# Patient Record
Sex: FEMALE | Race: WHITE | NOT HISPANIC OR LATINO
[De-identification: names, ages, dates, MRNs, and addresses within clinical notes are randomized per-mention and may not be internally consistent; named-entity substitution may affect disease eponyms.]

---

## 2017-01-21 PROBLEM — Z00.00 ENCOUNTER FOR PREVENTIVE HEALTH EXAMINATION: Status: ACTIVE | Noted: 2017-01-21

## 2017-03-24 ENCOUNTER — APPOINTMENT (OUTPATIENT)
Dept: CARDIOLOGY | Facility: CLINIC | Age: 77
End: 2017-03-24

## 2017-04-26 ENCOUNTER — APPOINTMENT (OUTPATIENT)
Dept: CARDIOLOGY | Facility: CLINIC | Age: 77
End: 2017-04-26

## 2017-04-28 ENCOUNTER — APPOINTMENT (OUTPATIENT)
Dept: CARDIOLOGY | Facility: CLINIC | Age: 77
End: 2017-04-28

## 2017-07-06 ENCOUNTER — APPOINTMENT (OUTPATIENT)
Dept: CARDIOLOGY | Facility: CLINIC | Age: 77
End: 2017-07-06

## 2017-10-30 ENCOUNTER — TRANSCRIPTION ENCOUNTER (OUTPATIENT)
Age: 77
End: 2017-10-30

## 2018-07-12 ENCOUNTER — APPOINTMENT (OUTPATIENT)
Dept: CARDIOLOGY | Facility: CLINIC | Age: 78
End: 2018-07-12
Payer: COMMERCIAL

## 2018-07-12 ENCOUNTER — NON-APPOINTMENT (OUTPATIENT)
Age: 78
End: 2018-07-12

## 2018-07-12 VITALS
HEART RATE: 65 BPM | SYSTOLIC BLOOD PRESSURE: 112 MMHG | WEIGHT: 178 LBS | HEIGHT: 68 IN | BODY MASS INDEX: 26.98 KG/M2 | DIASTOLIC BLOOD PRESSURE: 64 MMHG

## 2018-07-12 DIAGNOSIS — Z87.19 PERSONAL HISTORY OF OTHER DISEASES OF THE DIGESTIVE SYSTEM: ICD-10-CM

## 2018-07-12 DIAGNOSIS — Z78.9 OTHER SPECIFIED HEALTH STATUS: ICD-10-CM

## 2018-07-12 DIAGNOSIS — Z86.39 PERSONAL HISTORY OF OTHER ENDOCRINE, NUTRITIONAL AND METABOLIC DISEASE: ICD-10-CM

## 2018-07-12 DIAGNOSIS — Z01.810 ENCOUNTER FOR PREPROCEDURAL CARDIOVASCULAR EXAMINATION: ICD-10-CM

## 2018-07-12 DIAGNOSIS — Z86.018 PERSONAL HISTORY OF OTHER BENIGN NEOPLASM: ICD-10-CM

## 2018-07-12 DIAGNOSIS — C85.10 UNSPECIFIED B-CELL LYMPHOMA, UNSPECIFIED SITE: ICD-10-CM

## 2018-07-12 PROCEDURE — 99214 OFFICE O/P EST MOD 30 MIN: CPT

## 2018-07-12 PROCEDURE — 93000 ELECTROCARDIOGRAM COMPLETE: CPT

## 2018-07-12 RX ORDER — ESTRADIOL 10 UG/1
10 TABLET VAGINAL
Qty: 8 | Refills: 0 | Status: ACTIVE | COMMUNITY
Start: 2018-01-15

## 2018-07-12 RX ORDER — CELECOXIB 100 MG/1
100 CAPSULE ORAL
Refills: 0 | Status: ACTIVE | COMMUNITY

## 2018-07-12 RX ORDER — LEVOTHYROXINE SODIUM 75 UG/1
75 TABLET ORAL
Refills: 0 | Status: ACTIVE | COMMUNITY

## 2018-08-13 ENCOUNTER — APPOINTMENT (OUTPATIENT)
Dept: CARDIOLOGY | Facility: CLINIC | Age: 78
End: 2018-08-13
Payer: COMMERCIAL

## 2018-08-13 PROCEDURE — 93306 TTE W/DOPPLER COMPLETE: CPT

## 2018-08-13 PROCEDURE — 93880 EXTRACRANIAL BILAT STUDY: CPT

## 2018-09-28 ENCOUNTER — APPOINTMENT (OUTPATIENT)
Dept: CARDIOLOGY | Facility: CLINIC | Age: 78
End: 2018-09-28

## 2019-01-29 ENCOUNTER — TRANSCRIPTION ENCOUNTER (OUTPATIENT)
Age: 79
End: 2019-01-29

## 2019-07-18 ENCOUNTER — APPOINTMENT (OUTPATIENT)
Dept: CARDIOLOGY | Facility: CLINIC | Age: 79
End: 2019-07-18
Payer: COMMERCIAL

## 2019-07-18 ENCOUNTER — NON-APPOINTMENT (OUTPATIENT)
Age: 79
End: 2019-07-18

## 2019-07-18 VITALS
BODY MASS INDEX: 31.07 KG/M2 | SYSTOLIC BLOOD PRESSURE: 104 MMHG | WEIGHT: 182 LBS | DIASTOLIC BLOOD PRESSURE: 66 MMHG | HEIGHT: 64 IN | HEART RATE: 62 BPM

## 2019-07-18 PROCEDURE — 99214 OFFICE O/P EST MOD 30 MIN: CPT

## 2019-07-18 PROCEDURE — 93000 ELECTROCARDIOGRAM COMPLETE: CPT

## 2019-07-18 RX ORDER — OMEGA-3-ACID ETHYL ESTERS 1 G/1
1 CAPSULE, LIQUID FILLED ORAL
Refills: 0 | Status: DISCONTINUED | COMMUNITY
End: 2019-07-18

## 2019-07-18 NOTE — REASON FOR VISIT
[Follow-Up - Clinic] : a clinic follow-up of [Abnormal ECG] : an abnormal ECG [Chest Pain] : chest pain [Hyperlipidemia] : hyperlipidemia [Medication Management] : Medication management [Mitral Regurgitation] : mitral regurgitation

## 2019-07-18 NOTE — REVIEW OF SYSTEMS
[Dyspnea on exertion] : dyspnea during exertion [Chest Pain] : chest pain [see HPI] : see HPI [Under Stress] : under stress [Negative] : Heme/Lymph

## 2019-07-19 ENCOUNTER — TRANSCRIPTION ENCOUNTER (OUTPATIENT)
Age: 79
End: 2019-07-19

## 2019-07-22 NOTE — PHYSICAL EXAM
[General Appearance - Well Developed] : well developed [Normal Appearance] : normal appearance [Well Groomed] : well groomed [General Appearance - Well Nourished] : well nourished [No Deformities] : no deformities [General Appearance - In No Acute Distress] : no acute distress [Normal Conjunctiva] : the conjunctiva exhibited no abnormalities [Eyelids - No Xanthelasma] : the eyelids demonstrated no xanthelasmas [Normal Oral Mucosa] : normal oral mucosa [No Oral Pallor] : no oral pallor [No Oral Cyanosis] : no oral cyanosis [Normal Jugular Venous A Waves Present] : normal jugular venous A waves present [Normal Jugular Venous V Waves Present] : normal jugular venous V waves present [No Jugular Venous Dominguez A Waves] : no jugular venous dominguez A waves [Respiration, Rhythm And Depth] : normal respiratory rhythm and effort [Exaggerated Use Of Accessory Muscles For Inspiration] : no accessory muscle use [Auscultation Breath Sounds / Voice Sounds] : lungs were clear to auscultation bilaterally [Heart Rate And Rhythm] : heart rate and rhythm were normal [Heart Sounds] : normal S1 and S2 [Abdomen Soft] : soft [Abdomen Tenderness] : non-tender [Abdomen Mass (___ Cm)] : no abdominal mass palpated [Abnormal Walk] : normal gait [Gait - Sufficient For Exercise Testing] : the gait was sufficient for exercise testing [Nail Clubbing] : no clubbing of the fingernails [Cyanosis, Localized] : no localized cyanosis [Petechial Hemorrhages (___cm)] : no petechial hemorrhages [Skin Color & Pigmentation] : normal skin color and pigmentation [] : no rash [No Venous Stasis] : no venous stasis [Skin Lesions] : no skin lesions [No Skin Ulcers] : no skin ulcer [No Xanthoma] : no  xanthoma was observed [Oriented To Time, Place, And Person] : oriented to person, place, and time [Affect] : the affect was normal [Mood] : the mood was normal [No Anxiety] : not feeling anxious [FreeTextEntry1] : [facility] appex

## 2019-07-22 NOTE — HISTORY OF PRESENT ILLNESS
[FreeTextEntry1] : GANESH LONDONO  is a 78 year old  F\par History of lymphoma, hypothyroidism, GERD, hyperlipidemia, VHD, abnormal EKG, TIA, atherosclerosis, DM\par \par In the interim she reports her  was diagnosed with Alzheimer's. \par She is having more trouble with her 's cognitive dysfunction\par \par At baseline she is physically active.  She plays golf. She swims, bikesetc.\par Her cholesterol is persistently elevated.  She tries to watch her diet.  She has been tried on multiple statins as well as Zetia and welchol.\par There is no history of hypertension\par Told by her physicians in [place] that she has prediabetes and was started on metformin\par \par There is no prior history of clinical myocardial infarction, coronary revascularization, symptomatic congestive heart failure.\par \par There are no further episodes of chest pain, pressure or discomfort.  \par There is no significant dyspnea on exertion or orthopnea.  \par There are no symptomatic palpitations, lightheadedness, dizziness or syncope. \par \par Carotid Doppler, nonobstructive atherosclerosis, no stenosis\par Echocardiogram normal left ventricular function, minimal valvular heart disease\par EKG today demonstrates normal sinus with a competing ectopic atrial rhythm and poor wave progression. \par \par Nuclear stress test. April 2017. Exercise 4.5 minutes. Peak heart rate 124. Normal myocardial perfusion. \par \par May 2018. TSH 2.1, hemoglobin 14.1, total cholesterol 195, , creatinine 0.6\par

## 2019-07-22 NOTE — ASSESSMENT
[FreeTextEntry1] : History of TIA, atypical chest discomfort, hyperlipidemia, DM\par Abnormal EKG. \par Prior studies with no evidence of significant ischemic heart disease or LV dysfunction. \par No further symptoms of previously described chest discomfort\par Recent blood work has been requested from primary physician \par Recommend daily aspirin therapy.  Limit concomitant NSAID\par Continue present lipid lowering therapy. \par

## 2020-07-10 ENCOUNTER — NON-APPOINTMENT (OUTPATIENT)
Age: 80
End: 2020-07-10

## 2020-07-10 ENCOUNTER — APPOINTMENT (OUTPATIENT)
Dept: CARDIOLOGY | Facility: CLINIC | Age: 80
End: 2020-07-10
Payer: COMMERCIAL

## 2020-07-10 VITALS
WEIGHT: 164 LBS | BODY MASS INDEX: 28 KG/M2 | SYSTOLIC BLOOD PRESSURE: 118 MMHG | HEIGHT: 64 IN | HEART RATE: 71 BPM | DIASTOLIC BLOOD PRESSURE: 60 MMHG | OXYGEN SATURATION: 95 %

## 2020-07-10 PROCEDURE — 93000 ELECTROCARDIOGRAM COMPLETE: CPT

## 2020-07-10 PROCEDURE — 99213 OFFICE O/P EST LOW 20 MIN: CPT

## 2020-07-10 RX ORDER — ASPIRIN 81 MG
81 TABLET, DELAYED RELEASE (ENTERIC COATED) ORAL
Refills: 0 | Status: DISCONTINUED | COMMUNITY
End: 2020-07-10

## 2020-07-10 RX ORDER — LIFITEGRAST 50 MG/ML
5 SOLUTION/ DROPS OPHTHALMIC
Qty: 60 | Refills: 0 | Status: DISCONTINUED | COMMUNITY
Start: 2018-04-17 | End: 2020-07-10

## 2020-07-10 RX ORDER — RANITIDINE HYDROCHLORIDE 150 MG/1
150 CAPSULE ORAL
Refills: 0 | Status: DISCONTINUED | COMMUNITY
End: 2020-07-10

## 2020-07-10 RX ORDER — ICOSAPENT ETHYL 1000 MG/1
1 CAPSULE ORAL DAILY
Refills: 0 | Status: ACTIVE | COMMUNITY

## 2020-07-10 RX ORDER — FAMOTIDINE 40 MG/1
40 TABLET, FILM COATED ORAL DAILY
Refills: 0 | Status: ACTIVE | COMMUNITY

## 2020-07-10 RX ORDER — FLUVASTATIN SODIUM 80 MG/1
80 TABLET, EXTENDED RELEASE ORAL
Qty: 30 | Refills: 0 | Status: DISCONTINUED | COMMUNITY
Start: 2018-06-11 | End: 2020-07-10

## 2020-07-11 NOTE — HISTORY OF PRESENT ILLNESS
[FreeTextEntry1] : GANESH LONDONO  is a 80 year old  F\par History of lymphoma, hypothyroidism, GERD, hyperlipidemia, VHD, borderline EKG, ? TIA, atherosclerosis, DM\par There is no prior history of clinical myocardial infarction, coronary revascularization, symptomatic congestive heart failure.\par \par At baseline she is physically active.  She plays golf. She swims, bikesetc.\par There are no further episodes of chest pain, pressure or discomfort.  \par There is no significant dyspnea on exertion or orthopnea.  \par There are no symptomatic palpitations, lightheadedness, dizziness or syncope. \par Prior COVID exposure, had test\par Had labs with PMD\par Requsting refill of statin\par \par Carotid Doppler, nonobstructive atherosclerosis, no stenosis\par Echocardiogram normal left ventricular function, minimal valvular heart disease\par EKG PRWP\par \par Nuclear stress test. April 2017. Exercise 4.5 minutes. Peak heart rate 124. Normal myocardial perfusion. \par \par May 2018. TSH 2.1, hemoglobin 14.1, total cholesterol 195, , creatinine 0.6\par

## 2020-07-11 NOTE — PHYSICAL EXAM
[General Appearance - Well Developed] : well developed [Normal Appearance] : normal appearance [Well Groomed] : well groomed [General Appearance - Well Nourished] : well nourished [No Deformities] : no deformities [General Appearance - In No Acute Distress] : no acute distress [Normal Conjunctiva] : the conjunctiva exhibited no abnormalities [Normal Oral Mucosa] : normal oral mucosa [Eyelids - No Xanthelasma] : the eyelids demonstrated no xanthelasmas [No Oral Pallor] : no oral pallor [No Oral Cyanosis] : no oral cyanosis [Normal Jugular Venous V Waves Present] : normal jugular venous V waves present [Normal Jugular Venous A Waves Present] : normal jugular venous A waves present [No Jugular Venous Dominguez A Waves] : no jugular venous dominguez A waves [Respiration, Rhythm And Depth] : normal respiratory rhythm and effort [Exaggerated Use Of Accessory Muscles For Inspiration] : no accessory muscle use [Auscultation Breath Sounds / Voice Sounds] : lungs were clear to auscultation bilaterally [Heart Rate And Rhythm] : heart rate and rhythm were normal [Heart Sounds] : normal S1 and S2 [Abdomen Soft] : soft [Abdomen Tenderness] : non-tender [Abnormal Walk] : normal gait [Gait - Sufficient For Exercise Testing] : the gait was sufficient for exercise testing [Abdomen Mass (___ Cm)] : no abdominal mass palpated [Nail Clubbing] : no clubbing of the fingernails [Cyanosis, Localized] : no localized cyanosis [Petechial Hemorrhages (___cm)] : no petechial hemorrhages [Skin Color & Pigmentation] : normal skin color and pigmentation [] : no rash [No Venous Stasis] : no venous stasis [No Skin Ulcers] : no skin ulcer [Skin Lesions] : no skin lesions [No Xanthoma] : no  xanthoma was observed [Oriented To Time, Place, And Person] : oriented to person, place, and time [Affect] : the affect was normal [Mood] : the mood was normal [No Anxiety] : not feeling anxious [FreeTextEntry1] : [facility] appex tr edema

## 2020-07-11 NOTE — ASSESSMENT
[FreeTextEntry1] : History of TIA, atypical chest discomfort, hyperlipidemia, DM\par Abnormal EKG. \par Prior studies with no evidence of significant ischemic heart disease or LV dysfunction. \par No further symptoms of previously described chest discomfort\par Recent blood work has been requested from primary physician \par Recommend daily aspirin therapy.  Limit concomitant NSAID\par Likely adjust lipid lowering therapy after review of bloodwork. \par Follow up COVID test results

## 2020-07-14 ENCOUNTER — TRANSCRIPTION ENCOUNTER (OUTPATIENT)
Age: 80
End: 2020-07-14

## 2021-05-06 ENCOUNTER — NON-APPOINTMENT (OUTPATIENT)
Age: 81
End: 2021-05-06

## 2021-07-09 ENCOUNTER — APPOINTMENT (OUTPATIENT)
Dept: CARDIOLOGY | Facility: CLINIC | Age: 81
End: 2021-07-09
Payer: COMMERCIAL

## 2021-07-09 ENCOUNTER — NON-APPOINTMENT (OUTPATIENT)
Age: 81
End: 2021-07-09

## 2021-07-09 VITALS
TEMPERATURE: 97.1 F | BODY MASS INDEX: 28.17 KG/M2 | HEART RATE: 71 BPM | WEIGHT: 165 LBS | HEIGHT: 64 IN | OXYGEN SATURATION: 98 % | SYSTOLIC BLOOD PRESSURE: 100 MMHG | DIASTOLIC BLOOD PRESSURE: 52 MMHG

## 2021-07-09 DIAGNOSIS — I67.9 CEREBROVASCULAR DISEASE, UNSPECIFIED: ICD-10-CM

## 2021-07-09 PROCEDURE — 99214 OFFICE O/P EST MOD 30 MIN: CPT

## 2021-07-09 PROCEDURE — 99072 ADDL SUPL MATRL&STAF TM PHE: CPT

## 2021-07-09 PROCEDURE — 93000 ELECTROCARDIOGRAM COMPLETE: CPT

## 2021-07-09 RX ORDER — METFORMIN HYDROCHLORIDE 500 MG/1
500 TABLET, FILM COATED, EXTENDED RELEASE ORAL DAILY
Refills: 0 | Status: ACTIVE | COMMUNITY

## 2021-07-09 RX ORDER — ASPIRIN 81 MG
81 TABLET, DELAYED RELEASE (ENTERIC COATED) ORAL DAILY
Refills: 0 | Status: ACTIVE | COMMUNITY

## 2021-07-09 RX ORDER — ROSUVASTATIN CALCIUM 20 MG/1
20 TABLET, FILM COATED ORAL
Qty: 90 | Refills: 3 | Status: ACTIVE | COMMUNITY
Start: 2020-07-10 | End: 1900-01-01

## 2021-07-09 RX ORDER — ESCITALOPRAM OXALATE 10 MG/1
10 TABLET ORAL
Qty: 30 | Refills: 0 | Status: DISCONTINUED | COMMUNITY
Start: 2018-04-30 | End: 2021-07-09

## 2021-07-09 NOTE — ASSESSMENT
[FreeTextEntry1] : History of TIA, atypical chest discomfort, hyperlipidemia, DM\par Follow-up echocardiogram and stress test \par continue statin therapy \par clinical follow-up will be scheduled after noninvasive testing

## 2021-07-09 NOTE — HISTORY OF PRESENT ILLNESS
[FreeTextEntry1] : GANESH LONDONO  is a 81 year old  F\par \par History of lymphoma, hypothyroidism, GERD, hyperlipidemia, VHD, borderline EKG, ? TIA, atherosclerosis, DM\par There is no prior history of clinical myocardial infarction, coronary revascularization, symptomatic congestive heart failure.\par \par At baseline she is physically active.  She plays golf. She swims, bikesetc.\par she has symptoms of chest discomfort dull substernal over the past few weeks \par she attributes to stress  \par There is no significant dyspnea on exertion or orthopnea.  \par There are no symptomatic palpitations, lightheadedness, dizziness or syncope. \par she received the vaccines \par she has been less active due to taking care of her  and she spends part time between [facility] in the [place] \par \par Carotid Doppler, nonobstructive atherosclerosis, no stenosis\par Echocardiogram normal left ventricular function, minimal valvular heart disease\par EKG PRWP\par \par Nuclear stress test. April 2017. Exercise 4.5 minutes. Peak heart rate 124. Normal myocardial perfusion. \par today's EKG demonstrates normal sinus rhythm poor R wave progression \par last blood work May 2021 total cholesterol 149 LDL 66 creatinine 0.7 hemoglobin 14.0 \par \par

## 2021-07-23 ENCOUNTER — APPOINTMENT (OUTPATIENT)
Dept: CARDIOLOGY | Facility: CLINIC | Age: 81
End: 2021-07-23
Payer: COMMERCIAL

## 2021-07-23 PROCEDURE — 93306 TTE W/DOPPLER COMPLETE: CPT

## 2021-07-23 PROCEDURE — 93979 VASCULAR STUDY: CPT

## 2021-07-23 PROCEDURE — 99072 ADDL SUPL MATRL&STAF TM PHE: CPT

## 2021-08-02 ENCOUNTER — APPOINTMENT (OUTPATIENT)
Dept: CARDIOLOGY | Facility: CLINIC | Age: 81
End: 2021-08-02
Payer: COMMERCIAL

## 2021-08-02 PROCEDURE — 93015 CV STRESS TEST SUPVJ I&R: CPT

## 2021-08-02 PROCEDURE — 78452 HT MUSCLE IMAGE SPECT MULT: CPT

## 2021-08-02 PROCEDURE — A9502: CPT

## 2021-08-30 ENCOUNTER — APPOINTMENT (OUTPATIENT)
Dept: CARDIOLOGY | Facility: CLINIC | Age: 81
End: 2021-08-30
Payer: COMMERCIAL

## 2021-08-30 VITALS
DIASTOLIC BLOOD PRESSURE: 56 MMHG | HEART RATE: 70 BPM | TEMPERATURE: 96.1 F | SYSTOLIC BLOOD PRESSURE: 100 MMHG | BODY MASS INDEX: 27.66 KG/M2 | WEIGHT: 162 LBS | HEIGHT: 64 IN | OXYGEN SATURATION: 96 %

## 2021-08-30 DIAGNOSIS — I34.0 NONRHEUMATIC MITRAL (VALVE) INSUFFICIENCY: ICD-10-CM

## 2021-08-30 DIAGNOSIS — E78.5 HYPERLIPIDEMIA, UNSPECIFIED: ICD-10-CM

## 2021-08-30 DIAGNOSIS — R07.89 OTHER CHEST PAIN: ICD-10-CM

## 2021-08-30 DIAGNOSIS — R94.31 ABNORMAL ELECTROCARDIOGRAM [ECG] [EKG]: ICD-10-CM

## 2021-08-30 DIAGNOSIS — R07.9 CHEST PAIN, UNSPECIFIED: ICD-10-CM

## 2021-08-30 PROCEDURE — 99214 OFFICE O/P EST MOD 30 MIN: CPT

## 2021-08-30 NOTE — ASSESSMENT
[FreeTextEntry1] : GANESH LONDONO is a 81 year old F who presents today Aug 30, 2021 here to review cardiovascular test results. \par \par ACP, stress related not exertional\par Normal EF on echo, normal myocardial perfusion, no evidence of infarction or inducible ischemia on nuclear imaging. Reviewed limitations of noninvasive testing and if any new or worsening symptoms should occur advised him to notify our office immediately for further evaluation. Otherwise, Risk factor modification discussed. \par \par Hx of TIA, no recent recurrence. Cont ASA therapy. \par Mild nonobx carotid atherosclerosis, Cont statin rx. \par \par HLD, lipids well controlled. Cont crestor 20mg daily and vascepa. \par \par DM, management w/ PCP. \par no AAA on abd sono. \par \par Annual CV followup or sooner if any change in clinical status. \par Any questions and concerns were addressed and resolved. \par \par Sincerely,\par \par ALLI Baker\par Patients history, testing, and plan reviewed with supervising MD: Dr. Catrachito Eugene

## 2021-08-30 NOTE — HISTORY OF PRESENT ILLNESS
[FreeTextEntry1] : GANESH LONDONO  is a 81 year old  F here to review cardiovascular test results. \par \par History of lymphoma, hypothyroidism, GERD, hyperlipidemia, VHD, borderline EKG, ? TIA, atherosclerosis, DM\par There is no prior history of clinical myocardial infarction, coronary revascularization, symptomatic congestive heart failure.\par \par At baseline she is physically active.  She plays golf. She swims, bikes, etc.\par she has symptoms of chest discomfort dull substernal over the past few weeks - she attributes to stress  \par No exertonal chest pain\par There is no significant dyspnea on exertion or orthopnea.  \par There are no symptomatic palpitations, lightheadedness, dizziness or syncope. \par she received the vaccines \par she has been less active due to taking care of her  and she spends part time between [facility] in the [place] who has progressive dementia and may require full inaptient care. \par \par Echo 7/23/21 EF 60%, mild MR/AI, normal PASP, normal LV systolic function, mild to mod AK\par Abd US mild atherosclerosis, no AAA\par \par NST 8/2/21 3min 50sec erinn protocol 85% MPHR, physiologic CV response. No ischemia by EKG, normal myocardial perfusion, no evidence of infarction or inducible ischemia \par \par Carotid Doppler 2018, nonobstructive atherosclerosis, no stenosis\par EKG PRWP\par \par last blood work May 2021 total cholesterol 149 LDL 66 creatinine 0.7 hemoglobin 14.0 \par

## 2021-08-30 NOTE — ADDENDUM
[FreeTextEntry1] : Please note the patient was reviewed with the NP.\par I was physically present during the service of the patient\par I was directly involved in the management plan and recommendations of care provided to the patient. \par I personally reviewed the history and physical exam and plan as documented by the NP above.\par \par Catrachito Eugene DO, FACC, RPVI\par Cardiologist\par 08/30/2021

## 2022-07-05 ENCOUNTER — APPOINTMENT (OUTPATIENT)
Dept: CARDIOLOGY | Facility: CLINIC | Age: 82
End: 2022-07-05

## 2022-07-05 NOTE — HISTORY OF PRESENT ILLNESS
[FreeTextEntry1] : GANESH LONDONO  is a 82 year old  F\par \par History of lymphoma, hypothyroidism, GERD, hyperlipidemia, VHD, borderline EKG, ? TIA, atherosclerosis, DM\par There is no prior history of clinical myocardial infarction, coronary revascularization, symptomatic congestive heart failure.\par \par At baseline she is physically active.  She plays golf. She swims, bikes, etc.\par she has symptoms of chest discomfort dull substernal over the past few weeks - she attributes to stress  \par No exertonal chest pain\par There is no significant dyspnea on exertion or orthopnea.  \par There are no symptomatic palpitations, lightheadedness, dizziness or syncope. \par she received the vaccines \par she has been less active due to taking care of her  and she spends part time between [facility] in the [place] who has progressive dementia and may require full inaptient care. \par \par Echo 7/23/21 EF 60%, mild MR/AI, normal PASP, normal LV systolic function, mild to mod MD\par Abd US mild atherosclerosis, no AAA\par \par NST 8/2/21 3min 50sec erinn protocol 85% MPHR, physiologic CV response. No ischemia by EKG, normal myocardial perfusion, no evidence of infarction or inducible ischemia \par \par Carotid Doppler 2018, nonobstructive atherosclerosis, no stenosis\par EKG PRWP\par \par last blood work May 2021 total cholesterol 149 LDL 66 creatinine 0.7 hemoglobin 14.0 \par \par results. \par \par ACP, stress related not exertional\par Normal EF on echo, normal myocardial perfusion, no evidence of infarction or inducible ischemia on nuclear imaging. Reviewed limitations of noninvasive testing and if any new or worsening symptoms should occur advised him to notify our office immediately for further evaluation. Otherwise, Risk factor modification discussed. \par \par Hx of TIA, no recent recurrence. Cont ASA therapy. \par Mild nonobx carotid atherosclerosis, Cont statin rx. \par \par HLD, lipids well controlled. Cont crestor 20mg daily and vascepa. \par \par DM, management w/ PCP. \par no AAA on abd sono. \par \par Annual CV followup or sooner if any change in clinical status. \par Any questions and concerns were addressed and resolved. \par \par

## 2022-08-30 ENCOUNTER — APPOINTMENT (OUTPATIENT)
Dept: CARDIOLOGY | Facility: CLINIC | Age: 82
End: 2022-08-30